# Patient Record
Sex: MALE | Race: OTHER | HISPANIC OR LATINO | Employment: FULL TIME | URBAN - METROPOLITAN AREA
[De-identification: names, ages, dates, MRNs, and addresses within clinical notes are randomized per-mention and may not be internally consistent; named-entity substitution may affect disease eponyms.]

---

## 2022-11-17 ENCOUNTER — HOSPITAL ENCOUNTER (EMERGENCY)
Facility: HOSPITAL | Age: 22
Discharge: LEFT BEFORE DISCHARGE | End: 2022-11-18
Attending: EMERGENCY MEDICINE

## 2022-11-17 VITALS
TEMPERATURE: 100 F | RESPIRATION RATE: 18 BRPM | HEART RATE: 87 BPM | OXYGEN SATURATION: 99 % | SYSTOLIC BLOOD PRESSURE: 137 MMHG | DIASTOLIC BLOOD PRESSURE: 75 MMHG

## 2022-11-17 DIAGNOSIS — H60.502 ACUTE OTITIS EXTERNA OF LEFT EAR, UNSPECIFIED TYPE: ICD-10-CM

## 2022-11-17 DIAGNOSIS — Z53.21 ELOPED FROM EMERGENCY DEPARTMENT: Primary | ICD-10-CM

## 2022-11-17 LAB
CTP QC/QA: YES
POC MOLECULAR INFLUENZA A AGN: NEGATIVE
POC MOLECULAR INFLUENZA B AGN: NEGATIVE
SARS-COV-2 RDRP RESP QL NAA+PROBE: NEGATIVE

## 2022-11-17 PROCEDURE — 99284 EMERGENCY DEPT VISIT MOD MDM: CPT | Mod: ,,, | Performed by: PHYSICIAN ASSISTANT

## 2022-11-17 PROCEDURE — 99282 EMERGENCY DEPT VISIT SF MDM: CPT

## 2022-11-17 PROCEDURE — U0002 COVID-19 LAB TEST NON-CDC: HCPCS | Performed by: PHYSICIAN ASSISTANT

## 2022-11-17 PROCEDURE — 25000003 PHARM REV CODE 250: Performed by: PHYSICIAN ASSISTANT

## 2022-11-17 PROCEDURE — 99284 PR EMERGENCY DEPT VISIT,LEVEL IV: ICD-10-PCS | Mod: ,,, | Performed by: PHYSICIAN ASSISTANT

## 2022-11-17 PROCEDURE — 87502 INFLUENZA DNA AMP PROBE: CPT

## 2022-11-17 RX ORDER — ACETAMINOPHEN 500 MG
1000 TABLET ORAL
Status: COMPLETED | OUTPATIENT
Start: 2022-11-17 | End: 2022-11-17

## 2022-11-17 RX ORDER — OFLOXACIN 3 MG/ML
5 SOLUTION AURICULAR (OTIC) 2 TIMES DAILY
Qty: 5 ML | Refills: 0 | Status: SHIPPED | OUTPATIENT
Start: 2022-11-17 | End: 2022-11-18 | Stop reason: SDUPTHER

## 2022-11-17 RX ADMIN — ACETAMINOPHEN 1000 MG: 500 TABLET ORAL at 10:11

## 2022-11-17 NOTE — Clinical Note
"Clifton "Clifton" Lisa Kwong was seen and treated in our emergency department on 11/17/2022.  He may return to work on 11/18/2022.       If you have any questions or concerns, please don't hesitate to call.      Frank Valles PA-C"

## 2022-11-18 RX ORDER — OFLOXACIN 3 MG/ML
5 SOLUTION AURICULAR (OTIC) 2 TIMES DAILY
Qty: 5 ML | Refills: 0 | Status: SHIPPED | OUTPATIENT
Start: 2022-11-18 | End: 2022-11-25

## 2022-11-18 NOTE — ED NOTES
Pt arrives to Ed with c/o cough, fever, right ear pain x 3 days. Pt reports taking OTC medication with not relief of pain. Pt sitting in chair, respirations even, unlabored, NAD noted, answering questions appropriately

## 2022-11-18 NOTE — ED PROVIDER NOTES
Encounter Date: 11/17/2022       History     Chief Complaint   Patient presents with    Multiple Complaints     R ear pain, sore throat, cough x 3 days     The history is provided by the patient and medical records. The history is limited by a language barrier. A  was used.     Clifton Kwong is a Vietnamese speaking 22 y.o. male with no reported medical history presenting to the ED with multiple complaints.     Reports 3 days of R ear pain, sore throat, dry cough. Reports taking IBU for his symptoms without improvement. 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 Denies concerns for sick contacts. No dysphagia or hoarseness. No fever, neck pain, chest pain, SOB, abdominal pain, vomiting, urinary or bowel movement changes. Reports being up to date on his COVID vaccines.     Review of patient's allergies indicates:  Not on File  History reviewed. No pertinent past medical history.  History reviewed. No pertinent surgical history.  History reviewed. No pertinent family history.     Review of Systems   Constitutional:  Negative for fever.   HENT:  Positive for ear pain and sore throat.    Eyes:  Negative for pain.   Respiratory:  Positive for cough. Negative for shortness of breath.    Cardiovascular:  Negative for chest pain.   Gastrointestinal:  Negative for nausea.   Genitourinary:  Negative for dysuria.   Musculoskeletal:  Negative for back pain.   Skin:  Negative for rash.   Neurological:  Negative for weakness.   Hematological:  Does not bruise/bleed easily.     Physical Exam     Initial Vitals [11/17/22 2140]   BP Pulse Resp Temp SpO2   137/75 87 18 100.2 °F (37.9 °C) 99 %      MAP       --         Physical Exam    Constitutional: He appears well-developed and well-nourished. He is not diaphoretic. He is easily aroused.   HENT:   Head: Normocephalic and atraumatic.   Right Ear: There is tenderness (with manipulation). No drainage or swelling. Tympanic membrane is erythematous. No middle  ear effusion.   Left Ear: No drainage, swelling or tenderness.  No middle ear effusion.   Mouth/Throat: Oropharynx is clear and moist. No oropharyngeal exudate.   Posterior oropharynx clear and pink. Tolerating secretions. Uvula midline   Eyes: EOM and lids are normal. Pupils are equal, round, and reactive to light. No scleral icterus.   Neck: Phonation normal. Neck supple. No stridor present.   Normal range of motion.  Cardiovascular:  Normal rate and regular rhythm.           Pulmonary/Chest: Breath sounds normal. No stridor. No respiratory distress. He has no wheezes. He has no rales.   Abdominal: Abdomen is soft. He exhibits no distension. There is no abdominal tenderness. There is no rebound.   Musculoskeletal:         General: No tenderness or edema. Normal range of motion.      Cervical back: Normal range of motion and neck supple.     Neurological: He is alert, oriented to person, place, and time and easily aroused. He has normal strength. No sensory deficit.   Skin: Skin is warm and dry. No rash noted. No erythema.   Psychiatric: He has a normal mood and affect. His speech is normal.       ED Course   Procedures  Labs Reviewed   SARS-COV-2 RNA AMPLIFICATION, QUAL   HIV 1 / 2 ANTIBODY   HEPATITIS C ANTIBODY   POCT INFLUENZA A/B MOLECULAR          Imaging Results    None          Medications   acetaminophen tablet 1,000 mg (1,000 mg Oral Given 11/17/22 2227)     Medical Decision Making:   History:   Old Medical Records: I decided to obtain old medical records.  Old Records Summarized: records from clinic visits.  Clinical Tests:   Lab Tests: Ordered and Reviewed     APC / Resident Notes:   22 y.o. male with no reported medical history presenting to the ED c/o 3 days of R ear pain, sore throat, dry cough. DDx includes but not limited to viral syndrome, COVID-19, pharyngitis, sinusitis, otitis media, otitis externa, allergic rhinitis, reactive airway disease.              COVID and Influenza negative. Notified  by nurse that patient could not be found on reassessment. Planning to treat patient for otitis externa, but unable to discuss this with patient or provide prescription. Will chart patient as eloped.        Clinical Impression:   Final diagnoses:  [H60.502] Acute otitis externa of left ear, unspecified type  [Z53.21] Eloped from emergency department (Primary)      ED Disposition Condition    Eloped Stable                Frank Valles PA-C  11/18/22 0051

## 2022-11-18 NOTE — DISCHARGE INSTRUCTIONS
Take the prescribed Ofloxacin ear drops for further management of your ear pain.     Take Tylenol and Ibuprofen as needed for your pain.    Follow-up with your primary care provider for further evaluation.    Return to the emergency room for new, worsening, or concerning symptoms.

## 2022-11-18 NOTE — ED NOTES
Patient was no longer present in recliner when MD went to speak to him with discharge instructions and paperwork. Patient did not state he was leaving and was not seen leaving by staff